# Patient Record
Sex: FEMALE | Race: WHITE
[De-identification: names, ages, dates, MRNs, and addresses within clinical notes are randomized per-mention and may not be internally consistent; named-entity substitution may affect disease eponyms.]

---

## 2018-05-16 ENCOUNTER — HOSPITAL ENCOUNTER (EMERGENCY)
Dept: HOSPITAL 68 - ERH | Age: 53
End: 2018-05-16
Payer: COMMERCIAL

## 2018-05-16 VITALS — SYSTOLIC BLOOD PRESSURE: 142 MMHG | DIASTOLIC BLOOD PRESSURE: 69 MMHG

## 2018-05-16 VITALS — HEIGHT: 65 IN | BODY MASS INDEX: 25.83 KG/M2 | WEIGHT: 155 LBS

## 2018-05-16 DIAGNOSIS — R18.8: ICD-10-CM

## 2018-05-16 DIAGNOSIS — R10.9: Primary | ICD-10-CM

## 2018-05-16 DIAGNOSIS — R50.9: ICD-10-CM

## 2018-05-16 LAB
ABSOLUTE GRANULOCYTE CT: 4.9 /CUMM (ref 1.4–6.5)
APTT BLD: 29 SEC (ref 25–37)
BASOPHILS # BLD: 0 /CUMM (ref 0–0.2)
BASOPHILS NFR BLD: 0.7 % (ref 0–2)
EOSINOPHIL # BLD: 0 /CUMM (ref 0–0.7)
EOSINOPHIL NFR BLD: 0.5 % (ref 0–5)
ERYTHROCYTE [DISTWIDTH] IN BLOOD BY AUTOMATED COUNT: 15.3 % (ref 11.5–14.5)
GRANULOCYTES NFR BLD: 78.3 % (ref 42.2–75.2)
HCT VFR BLD CALC: 26.9 % (ref 37–47)
LYMPHOCYTES # BLD: 0.8 /CUMM (ref 1.2–3.4)
MCH RBC QN AUTO: 28.3 PG (ref 27–31)
MCHC RBC AUTO-ENTMCNC: 32.7 G/DL (ref 33–37)
MCV RBC AUTO: 86.7 FL (ref 81–99)
MONOCYTES # BLD: 0.5 /CUMM (ref 0.1–0.6)
PLATELET # BLD: 428 /CUMM (ref 130–400)
PMV BLD AUTO: 7.6 FL (ref 7.4–10.4)
PROTHROMBIN TIME: 12 SEC (ref 9.4–12.5)
RED BLOOD CELL CT: 3.1 /CUMM (ref 4.2–5.4)
WBC # BLD AUTO: 6.3 /CUMM (ref 4.8–10.8)

## 2018-05-16 NOTE — CT SCAN REPORT
EXAMINATION:
CT ABDOMEN AND PELVIS WITH CONTRAST
 
CLINICAL INFORMATION:
Abdominal pain and fever.
 
COMPARISON:
None
 
TECHNIQUE:
Multidetector volumetric imaging was performed of the abdomen and pelvis
following IV administration of 95 mL of Optiray 320 intravenous contrast.
Sagittal and coronal reformatted images were obtained on the technologist's
workstation.
 
DLP:
299 mGy-cm
 
FINDINGS:
 
LUNG BASES: The peripheral opacities in the dependent aspect of each lower
lobe have a relatively symmetric appearance and likely represent mild
atelectasis. Also, linear opacities of atelectasis are present in the
inferior aspect of the middle lobe and inferior lingula. No pericardial or
pleural effusion.
 
LIVER, GALLBLADDER, AND BILIARY TREE: Liver has normal size, contour and
attenuation. No focal hepatic lesion or intrahepatic bile duct dilatation.
Gallbladder is unremarkable. No radiopaque calculi or gallbladder wall edema.
 
PANCREAS: Unremarkable.
 
SPLEEN: Unremarkable.
 
ADRENAL GLANDS: Unremarkable.
 
KIDNEYS AND URETERS: Kidneys are normal in size and enhance symmetrically. No
suspicious renal parenchymal lesion, hydronephrosis or perinephric edema.
There are small, 2 mm calculi of the mid and lower pole of the left kidney.
The ureters are unremarkable.
 
BLADDER: Unremarkable.
 
GASTROINTESTINAL TRACT: The stomach is underdistended. Loops of bowel are
normal in caliber. The appendix is normal. No focal bowel wall thickening is
identified. No pericolonic fat stranding. No pneumoperitoneum or abscess. A
trace amount of free fluid is seen along the right paracolic gutter. Also,
small amount of simple appearing free fluid is seen in the pelvic cul-de-sac.
 
 
ABDOMINAL WALL: Unremarkable.
 
LYMPH NODES: No pathologic sized lymph nodes within the abdomen or pelvis.
The visualized left para-aortic lymph nodes are in the normal size range.
 
VASCULAR: Mild atherosclerosis of the abdominal aorta without aneurysm.
Inferior vena cava is unremarkable.
 
PELVIC VISCERA: The anteflexed uterus is grossly unremarkable. No adnexal
mass.
 
OSSEOUS STRUCTURES: Mild spondylosis of the visualized lower thoracic and
lumbar spine. Within the lumbar spine, facet arthropathy is worst on the
right at L4-L5. The lumbar vertebra have normal height and alignment. No
suspicious osseous lesions.
 
IMPRESSION:
- Mild atelectasis in dependent aspect of each lower lobe.
- Small amount of free fluid is present within the abdomen and pelvis. The
significance of this is uncertain. No pneumoperitoneum or abscess.
- The appendix is normal. No evidence of acute inflammation or obstruction
along the gastrointestinal tract.

## 2018-05-16 NOTE — ULTRASOUND REPORT
EXAMINATION:
US TRANSVAGINAL
 
CLINICAL INFORMATION:
Abdominal pain and fever. Fluid and pelvis
 
COMPARISON:
CT scan of the abdomen and pelvis from earlier today
 
TECHNIQUE:
Multiple sonographic views of the pelvis were obtained.
 
FINDINGS:
Uterus is anteroverted measuring 5.3 x 3.0 x 4.2 cm in size. Cervical length
is approximately 2.9 cm. Endometrial thickness is within normal limits
measuring 0.2 cm maximally.
 
Ovaries are normal in size, shape, and echotexture. The right ovary measures
1.1 x 0.9 x 1.0 cm for a volume of 0.52 mL. Left ovary measures 1.9 x 1.2 x
0.8 cm for a volume of 1.0 mL.
 
Simple appearing fluid is seen in the cul-de-sac possibly physiologic in a
patient of this age.
 
IMPRESSION:
Free fluid is seen in the cul-de-sac of uncertain etiology. Uterus and adnexa
are unremarkable.

## 2018-05-16 NOTE — ED GI/GU/ABDOMINAL COMPLAINT
History of Present Illness
 
General
Chief Complaint: Abdominal Pain/Flank Pain
Stated Complaint: R FLANK PAIN
Source: patient
Exam Limitations: no limitations
 
Vital Signs & Intake/Output
Vital Signs & Intake/Output
 Vital Signs
 
 
Date Time Temp Pulse Resp B/P B/P Pulse O2 O2 Flow FiO2
 
     Mean Ox Delivery Rate 
 
 1439 99.9        
 
 1417 99.9 72 18 142/69  95 Room Air  
 
 1407 100.2        
 
 1243      97   
 
 1202 100.2 83 18 171/78  95 Room Air  
 
 
 
Allergies
Coded Allergies:
NSAIDS (Non-Steroidal Anti-Inflamma (HIVES 18)
aspirin (HIVES 18)
 
Reconcile Medications
Atorvastatin Calcium 20 MG TABLET   1 TAB PO DAILY CHOLESTEROL  (Reported)
Cetirizine HCl (Zyrtec) 10 MG TABLET   1 TAB PO DAILY ALLERGIES  (Reported)
Clopidogrel Bisulfate (Clopidogrel) 75 MG TABLET   1 TAB PO DAILY BLOOD THINNER 
(Reported)
Dipyridamole 50 MG TABLET   1 TAB PO TID UNKNOWN  (Reported)
Furosemide 20 MG TABLET   1 TAB PO DAILY WATER RETENTION  (Reported)
Metoprolol Tartrate 25 MG TABLET   1 TAB PO BID HEART  (Reported)
Oxycodone HCl/Acetaminophen (Percocet 5-325 MG Tablet) 5 MG-325 MG TABLET   1 
TAB PO BID PRN PAIN
PARoxetine mesylate 7.5 MG CAPSULE   1 TAB PO DAILY HOT FLASHES  (Reported)
 
Triage Note:
PT TO ER C/C 1 WEEK HX OF RUQ PAIN, WORSE WITH
 DEEP INSPIRATION. TEMP 100.2, PT RIGOROUS IN
 TRIAGE. STATES PAIN NON-RADIATING. +NAUSEA. PT
 REPORTS LESSENED APPETITE.
Triage Nurses Notes Reviewed? yes
Pregnant? N
Is pt currently breastfeeding? No
Duration: constant
Timing: recent history
Severity Numbers: 7
HPI:
Patient is 62-year-old female with past medical history of hypertension and non-
STEMI with status post cardiac stents who presents emergency room with concerns 
of a low-grade right flank and upper quadrant abdominal pain for the past 6 days
however today patient noted fevers and worsening pain.  Patient has no change in
symptoms upon eating and drinking.  No change in bowel movements.  Denies any 
chest pain cough shortness of breath are pain jaw pain.
Denies any nausea vomiting dysuria hematuria vaginal bleeding or discharge.
Denies any significant NSAID use or alcohol use.
(Jong ESPARZA,Navdeep)
 
Past History
 
Travel History
Traveled to [place] past 21 day No
 
Medical History
Any Pertinent Medical History? see below for history
Cardiovascular: hypertension, STEMI, STENTS 2004
Pneumonia Vaccine: 10/01/05
 
Surgical History
Surgical History: non-contributory
 
Psychosocial History
Who do you live with P/F
What is your primary language English
Tobacco Use: Never used
 
Family History
Hx Contributory? No
(Navdeep Mesa)
 
Review of Systems
 
Review of Systems
Constitutional:
Reports: see HPI. 
EENTM:
Reports: no symptoms. 
Respiratory:
Reports: no symptoms. 
Cardiovascular:
Reports: no symptoms. 
GI:
Reports: see HPI. 
Genitourinary:
Reports: no symptoms. 
Musculoskeletal:
Reports: no symptoms. 
Skin:
Reports: no symptoms. 
Neurological/Psychological:
Reports: no symptoms. 
Hematologic/Endocrine:
Reports: no symptoms. 
Immunologic/Allergic:
Reports: no symptoms. 
All Other Systems: Reviewed and Negative
(Navdeep Mesa)
 
Physical Exam
 
Physical Exam
General Appearance: lethargic
Head: atraumatic
Eyes:
Bilateral: normal appearance, PERRL. 
Ears, Nose, Throat, Mouth: hearing grossly normal, moist mucous membrane
Neck: normal inspection
Respiratory: normal breath sounds, chest non-tender
Gastrointestinal: normal bowel sounds, soft, ruq pain no rlq pain no peritoneal 
signs 
Extremities: normal range of motion
Neurologic/Psych: no motor/sensory deficits
Skin: intact, normal color
 
Core Measures
ACS in differential dx? No
Sepsis Present: No
Sepsis Focused Exam Completed? No
(Navdeep Mesa)
 
Progress
Differential Diagnosis: AAA, AMI, appendicitis, biliary colic, bowel obstruction
, colon cancer, cholecystitis, diverticulitis, endometritis, esophageal varices,
gastritis, hepatitis, hernia, hemorrhoids, ischemic bowel, inflamm bowel dis, 
kidney stone, ovarian cyst, ovarian torsion, pancreatitis, PID/cervicitis, 
peptic ulcer, PUD/GERD, perforated viscous, SBO, UTI/pyelo
Plan of Care:
 Orders
 
 
Procedure Date/time Status
 
URINALYSIS  1228 Complete
 
PARTIAL THROMBOPLASTIN TIME  1217 Complete
 
PROTHROMBIN TIME  1217 Complete
 
LIPASE  1217 Complete
 
LACTIC ACID  1217 Complete
 
DIRECT BILIRUBIN  1217 Complete
 
COMPREHENSIVE METABOLIC PANEL  1217 Complete
 
CBC WITHOUT DIFFERENTIAL  1217 Complete
 
 
 Laboratory Tests
 
 
 
18 1517:
Lactic Acid Cancelled
 
18 1400:
Anion Gap 12, Estimated GFR > 60, BUN/Creatinine Ratio 23.3, Glucose 96, Lactic 
Acid 0.9, Calcium 8.8, Total Bilirubin 0.6, Direct Bilirubin 0.1, AST 26, ALT 31
, Alkaline Phosphatase 116, Total Protein 7.2, Albumin 3.9, Globulin 3.3, 
Albumin/Globulin Ratio 1.2, Lipase 128, PT 12.0, INR 1.10, APTT 29, CBC w Diff 
NO MAN DIFF REQ, RBC 3.10  L, MCV 86.7, MCH 28.3, MCHC 32.7  L, RDW 15.3  H, MPV
7.6, Gran % 78.3  H, Lymphocytes % 12.9  L, Monocytes % 7.6, Eosinophils % 0.5, 
Basophils % 0.7, Absolute Granulocytes 4.9, Absolute Lymphocytes 0.8  L, 
Absolute Monocytes 0.5, Absolute Eosinophils 0, Absolute Basophils 0
 
18 1228:
Urinalysis LIGHT  H, Urine Color STRAW, Urine Clarity CLEAR, Urine pH 6.0, Ur 
Specific Gravity 1.010, Urine Protein NEG, Urine Ketones NEG, Urine Nitrite NEG,
Urine Bilirubin NEG, Urine Urobilinogen 0.2, Ur Leukocyte Esterase NEG, Ur 
Microscopic SEDIMENT EXAMINED, Urine RBC RARE, Urine WBC RARE, Ur Epithelial 
Cells RARE, Urine Mucus RARE, Urine Hemoglobin TRACE-LYSED, Urine Glucose NEG
Patient on initial presentation was noted to be lethargic and with a low-grade 
temperature with primarily right upper quadrant and right flank pain on exam no 
suprapubic pain no peritoneal signs.
Blood work was resulted I reviewed unremarkable findings with patient patient 
also had incidental findings of nonspecific abdominal fluid collection with no 
concerns of infectious process or abscess and pelvic trace amounts of fluid 
where ultrasounds are also resulted no concerns of right upper quadrant 
ultrasounds findings and patient had nonspecific peripelvic fluid which I 
strongly advised patient to follow up with her OB/GYN Dr. Nicole.  Patient was 
able tolerate by mouth upon discharge patient also had significant improvement 
of pain upon discharge.
 
I did discuss CT scan and patient results with gastroenterology Dr. HARMON who
also advised ultrasound and follow-up with OB/GYN
Diagnostic Imaging:
Viewed by Me: CT Scan, Ultrasound. 
Radiology Impression: see comments
Initial ED EKG: none
Comments:
 
PATIENT: VELVET OSBORNE  MEDICAL RECORD NO: [id number]
PRESENT AGE: 52  PATIENT ACCOUNT NO: [id number]
: 61  LOCATION: [facility]
ORDERING PHYSICIAN: Navdeep ESPARZA  
 
  SERVICE DATE: 6753
EXAM TYPE: US - US-TRANSVAGINAL
 
EXAMINATION:
US TRANSVAGINAL
 
CLINICAL INFORMATION:
Abdominal pain and fever. Fluid and pelvis
 
COMPARISON:
CT scan of the abdomen and pelvis from earlier today
 
TECHNIQUE:
Multiple sonographic views of the pelvis were obtained.
 
FINDINGS:
Uterus is anteroverted measuring 5.3 x 3.0 x 4.2 cm in size. Cervical length
is approximately 2.9 cm. Endometrial thickness is within normal limits
measuring 0.2 cm maximally.
 
Ovaries are normal in size, shape, and echotexture. The right ovary measures
1.1 x 0.9 x 1.0 cm for a volume of 0.52 mL. Left ovary measures 1.9 x 1.2 x
0.8 cm for a volume of 1.0 mL.
 
Simple appearing fluid is seen in the cul-de-sac possibly physiologic in a
patient of this age.
 
IMPRESSION:
Free fluid is seen in the cul-de-sac of uncertain etiology. Uterus and adnexa
are unremarkable.
 
 
DICTATED BY: Martir Wood MD 
DATE/TIME DICTATED:18
:RAD[employer]BECK 
DATE/TIME TRANSCRIBED:18
 
CONFIDENTIAL, DO NOT COPY WITHOUT APPROPRIATE AUTHORIZATION.
 
 <Electronically signed in Other Vendor System>                                 
          
                                           SIGNED BY: Martir Wood MD [id number]
 
 
 
 
PATIENT: VELVET OSBORNE  MEDICAL RECORD NO: [id number]
PRESENT AGE: 52  PATIENT ACCOUNT NO: [id number]
: 61  LOCATION: [facility]
ORDERING PHYSICIAN: Navdeep ESPARZA  
 
  SERVICE DATE: 541
EXAM TYPE: US - US-LIMITED ABDOMEN
 
EXAMINATION:
US ABDOMEN LIMITED
 
CLINICAL INFORMATION:
Right upper quadrant pain.
 
COMPARISON:
CT scan of earlier today
 
TECHNIQUE:
Real-time imaging of the right upper quadrant abdominal viscera.
 
FINDINGS:
 
PANCREAS: Normal.
 
LIVER: Normal. The liver demonstrates normal size, contour and echogenicity.
No focal lesion or intrahepatic biliary duct dilatation.
 
GALLBLADDER: Normal. The gallbladder is physiologically distended without
evidence of stones, sludge, polyps, wall thickening or pericholecystic fluid.
 
COMMON BILE DUCT: Normal in caliber measuring 0.3 cm in diameter.
 
RIGHT KIDNEY: Normal. No hydronephrosis. No renal calculi or focal
parenchymal lesions. The kidney measures 11.0 cm in maximum dimension.
 
FREE FLUID: None.
 
IMPRESSION:
Unremarkable right upper quadrant ultrasound
 
DICTATED BY: Martir Wood MD 
DATE/TIME DICTATED:18
:RAD.BECK 
DATE/TIME TRANSCRIBED:18
 
CONFIDENTIAL, DO NOT COPY WITHOUT APPROPRI
 
PATIENT: VELVET OSBORNE  MEDICAL RECORD NO: [id number]
PRESENT AGE: 52  PATIENT ACCOUNT NO: [id number]
: 61  LOCATION: [facility]
ORDERING PHYSICIAN: Navdeep ESPARZA  
 
  SERVICE DATE: 
EXAM TYPE: CAT - CT ABD & PELVIS W IV CONTRAST
 
EXAMINATION:
CT ABDOMEN AND PELVIS WITH CONTRAST
 
CLINICAL INFORMATION:
Abdominal pain and fever.
 
COMPARISON:
None
 
TECHNIQUE:
Multidetector volumetric imaging was performed of the abdomen and pelvis
following IV administration of 95 mL of Optiray 320 intravenous contrast.
Sagittal and coronal reformatted images were obtained on the technologist's
workstation.
 
DLP:
299 mGy-cm
 
FINDINGS:
 
LUNG BASES: The peripheral opacities in the dependent aspect of each lower
lobe have a relatively symmetric appearance and likely represent mild
atelectasis. Also, linear opacities of atelectasis are present in the
inferior aspect of the middle lobe and inferior lingula. No pericardial or
pleural effusion.
 
LIVER, GALLBLADDER, AND BILIARY TREE: Liver has normal size, contour and
attenuation. No focal hepatic lesion or intrahepatic bile duct dilatation.
Gallbladder is unremarkable. No radiopaque calculi or gallbladder wall edema.
 
PANCREAS: Unremarkable.
 
SPLEEN: Unremarkable.
 
ADRENAL GLANDS: Unremarkable.
 
KIDNEYS AND URETERS: Kidneys are normal in size and enhance symmetrically. No
suspicious renal parenchymal lesion, hydronephrosis or perinephric edema.
There are small, 2 mm calculi of the mid and lower pole of the left kidney.
The ureters are unremarkable.
 
BLADDER: Unremarkable.
 
GASTROINTESTINAL TRACT: The stomach is underdistended. Loops of bowel are
normal in caliber. The appendix is normal. No focal bowel wall thickening is
identified. No pericolonic fat stranding. No pneumoperitoneum or abscess. A
trace amount of free fluid is seen along the right paracolic gutter. Also,
small amount of simple appearing free fluid is seen in the pelvic cul-de-sac.
 
 
ABDOMINAL WALL: Unremarkable.
 
LYMPH NODES: No pathologic sized lymph nodes within the abdomen or pelvis.
The visualized left para-aortic lymph nodes are in the normal size range.
 
VASCULAR: Mild atherosclerosis of the abdominal aorta without aneurysm.
Inferior vena cava is unremarkable.
 
PELVIC VISCERA: The anteflexed uterus is grossly unremarkable. No adnexal
mass.
 
OSSEOUS STRUCTURES: Mild spondylosis of the visualized lower thoracic and
lumbar spine. Within the lumbar spine, facet arthropathy is worst on the
right at L4-L5. The lumbar vertebra have normal height and alignment. No
suspicious osseous lesions.
 
IMPRESSION:
- Mild atelectasis in dependent aspect of each lower lobe.
- Small amount of free fluid is present within the abdomen and pelvis. The
significance of this is uncertain. No pneumoperitoneum or abscess.
- The appendix is normal. No evidence of acute inflammation or obstruction
along the gastrointestinal tract.
 
DICTATED BY: Kris Trevizo MD 
DATE/TIME DICTATED:18
:ROMAN 
(Navdeep Mesa)
 
Departure
 
Departure
Disposition: HOME OR SELF CARE
Condition: Stable
Clinical Impression
Primary Impression: Abdominal pain
Secondary Impressions: Pelvic fluid collection
Referrals:
Corey MONTANEZ,Stephanie Barber MD,Nikunj Bella MD,Daniele Vigil (PCP/Family)
 
Additional Instructions:
As discussed begin the prescription of Percocet for pain, follow-up tomorrow 
with your OB/GYN Dr Nicole and if symptoms do not improve in 2 days follow-up 
with gastroenterologist Dr. Barber.  If symptoms worsen or if you develop new 
concerning symptom return to emergency room.  Prescriptions waiting at [facility].
Departure Forms:
Customer Survey
General Discharge Information
Prescriptions:
Current Visit Scripts
Oxycodone HCl/Acetaminophen (Percocet 5-325 MG Tablet) 1 TAB PO BID PRN PAIN 
     #10 TAB 
 
 
(Navdeep Mesa)
 
PA/NP Co-Sign Statement
Statement:
ED Attending supervision documentation-
 
[] I saw and evaluated the patient. I have also reviewed all the pertinent lab 
results and diagnostic results. I agree with the findings and the plan of care 
as documented in the PA's/NP's documentation. 
 
[x] I have reviewed the ED Record and agree with the PA's/NP's documentation.
 
[] Additions or exceptions (if any) to the PAs/NP's note and plan are 
summarized below:
[]
 
(Bill Zelaya DO

## 2018-05-16 NOTE — ULTRASOUND REPORT
EXAMINATION:
US ABDOMEN LIMITED
 
CLINICAL INFORMATION:
Right upper quadrant pain.
 
COMPARISON:
CT scan of earlier today
 
TECHNIQUE:
Real-time imaging of the right upper quadrant abdominal viscera.
 
FINDINGS:
 
PANCREAS: Normal.
 
LIVER: Normal. The liver demonstrates normal size, contour and echogenicity.
No focal lesion or intrahepatic biliary duct dilatation.
 
GALLBLADDER: Normal. The gallbladder is physiologically distended without
evidence of stones, sludge, polyps, wall thickening or pericholecystic fluid.
 
COMMON BILE DUCT: Normal in caliber measuring 0.3 cm in diameter.
 
RIGHT KIDNEY: Normal. No hydronephrosis. No renal calculi or focal
parenchymal lesions. The kidney measures 11.0 cm in maximum dimension.
 
FREE FLUID: None.
 
IMPRESSION:
Unremarkable right upper quadrant ultrasound